# Patient Record
Sex: FEMALE | Race: WHITE | NOT HISPANIC OR LATINO | URBAN - METROPOLITAN AREA
[De-identification: names, ages, dates, MRNs, and addresses within clinical notes are randomized per-mention and may not be internally consistent; named-entity substitution may affect disease eponyms.]

---

## 2017-10-20 ENCOUNTER — APPOINTMENT (RX ONLY)
Dept: URBAN - METROPOLITAN AREA CLINIC 20 | Facility: CLINIC | Age: 67
Setting detail: DERMATOLOGY
End: 2017-10-20

## 2017-10-20 DIAGNOSIS — L57.8 OTHER SKIN CHANGES DUE TO CHRONIC EXPOSURE TO NONIONIZING RADIATION: ICD-10-CM

## 2017-10-20 DIAGNOSIS — L82.1 OTHER SEBORRHEIC KERATOSIS: ICD-10-CM

## 2017-10-20 PROBLEM — I10 ESSENTIAL (PRIMARY) HYPERTENSION: Status: ACTIVE | Noted: 2017-10-20

## 2017-10-20 PROCEDURE — ? ADDITIONAL NOTES

## 2017-10-20 PROCEDURE — ? COUNSELING

## 2017-10-20 PROCEDURE — 17110 DESTRUCTION B9 LES UP TO 14: CPT

## 2017-10-20 PROCEDURE — 99213 OFFICE O/P EST LOW 20 MIN: CPT | Mod: 25

## 2017-10-20 PROCEDURE — ? SUNSCREEN RECOMMENDATIONS

## 2017-10-20 PROCEDURE — ? LIQUID NITROGEN

## 2017-10-20 ASSESSMENT — LOCATION DETAILED DESCRIPTION DERM
LOCATION DETAILED: RIGHT SUPERIOR LATERAL MALAR CHEEK
LOCATION DETAILED: LEFT INFERIOR MEDIAL FOREHEAD
LOCATION DETAILED: LEFT POSTERIOR EAR
LOCATION DETAILED: RIGHT RADIAL DORSAL HAND
LOCATION DETAILED: RIGHT LATERAL ZYGOMA
LOCATION DETAILED: LEFT INFERIOR ANTERIOR NECK
LOCATION DETAILED: RIGHT SUPERIOR NASAL CHEEK
LOCATION DETAILED: LEFT ULNAR DORSAL HAND
LOCATION DETAILED: LEFT SUPERIOR HELIX

## 2017-10-20 ASSESSMENT — LOCATION ZONE DERM
LOCATION ZONE: NECK
LOCATION ZONE: EAR
LOCATION ZONE: HAND
LOCATION ZONE: FACE

## 2017-10-20 ASSESSMENT — LOCATION SIMPLE DESCRIPTION DERM
LOCATION SIMPLE: RIGHT CHEEK
LOCATION SIMPLE: RIGHT HAND
LOCATION SIMPLE: LEFT FOREHEAD
LOCATION SIMPLE: LEFT ANTERIOR NECK
LOCATION SIMPLE: RIGHT ZYGOMA
LOCATION SIMPLE: LEFT HAND
LOCATION SIMPLE: LEFT EAR

## 2017-10-20 NOTE — PROCEDURE: COUNSELING
Patient Specific Counseling (Will Not Stick From Patient To Patient): Cosmetic fee of $80.00 quoted for LN2 today. Pt opts to proceed with LN2 as it has worked well in the past cosmetically.
Detail Level: Detailed
Detail Level: Zone

## 2017-10-20 NOTE — PROCEDURE: LIQUID NITROGEN
Include Z78.9 (Other Specified Conditions Influencing Health Status) As An Associated Diagnosis?: No
Medical Necessity Information: It is in your best interest to select a reason for this procedure from the list below. All of these items fulfill various CMS LCD requirements except the new and changing color options.
Medical Necessity Clause: This procedure was medically necessary because the lesions that were treated were:
Consent: The patient's consent was obtained including but not limited to risks of crusting, scabbing, blistering, scarring, darker or lighter pigmentary change, recurrence, incomplete removal and infection.
Detail Level: Detailed
Post-Care Instructions: I reviewed with the patient in detail post-care instructions. Patient is to wear sunprotection, and avoid picking at any of the treated lesions. Pt may apply Vaseline to crusted or scabbing areas.

## 2017-10-20 NOTE — PROCEDURE: ADDITIONAL NOTES
Additional Notes: Advised FBE in the next few months. Pt will schedule as she does not have time today for FBE.

## 2018-01-19 ENCOUNTER — APPOINTMENT (RX ONLY)
Dept: URBAN - METROPOLITAN AREA CLINIC 20 | Facility: CLINIC | Age: 68
Setting detail: DERMATOLOGY
End: 2018-01-19

## 2018-01-19 DIAGNOSIS — D18.0 HEMANGIOMA: ICD-10-CM

## 2018-01-19 DIAGNOSIS — L82.1 OTHER SEBORRHEIC KERATOSIS: ICD-10-CM

## 2018-01-19 DIAGNOSIS — D22 MELANOCYTIC NEVI: ICD-10-CM

## 2018-01-19 DIAGNOSIS — Z71.89 OTHER SPECIFIED COUNSELING: ICD-10-CM

## 2018-01-19 DIAGNOSIS — L82.0 INFLAMED SEBORRHEIC KERATOSIS: ICD-10-CM

## 2018-01-19 DIAGNOSIS — L81.4 OTHER MELANIN HYPERPIGMENTATION: ICD-10-CM

## 2018-01-19 PROBLEM — D22.5 MELANOCYTIC NEVI OF TRUNK: Status: ACTIVE | Noted: 2018-01-19

## 2018-01-19 PROBLEM — D18.01 HEMANGIOMA OF SKIN AND SUBCUTANEOUS TISSUE: Status: ACTIVE | Noted: 2018-01-19

## 2018-01-19 PROBLEM — D22.12 MELANOCYTIC NEVI OF LEFT EYELID, INCLUDING CANTHUS: Status: ACTIVE | Noted: 2018-01-19

## 2018-01-19 PROBLEM — D22.62 MELANOCYTIC NEVI OF LEFT UPPER LIMB, INCLUDING SHOULDER: Status: ACTIVE | Noted: 2018-01-19

## 2018-01-19 PROBLEM — D22.61 MELANOCYTIC NEVI OF RIGHT UPPER LIMB, INCLUDING SHOULDER: Status: ACTIVE | Noted: 2018-01-19

## 2018-01-19 PROCEDURE — 17110 DESTRUCTION B9 LES UP TO 14: CPT

## 2018-01-19 PROCEDURE — 99214 OFFICE O/P EST MOD 30 MIN: CPT | Mod: 25

## 2018-01-19 PROCEDURE — ? OBSERVATION AND MEASURE

## 2018-01-19 PROCEDURE — ? SUNSCREEN RECOMMENDATIONS

## 2018-01-19 PROCEDURE — ? LIQUID NITROGEN

## 2018-01-19 PROCEDURE — ? COUNSELING

## 2018-01-19 ASSESSMENT — LOCATION ZONE DERM
LOCATION ZONE: EAR
LOCATION ZONE: EYELID
LOCATION ZONE: TRUNK
LOCATION ZONE: ARM
LOCATION ZONE: FACE
LOCATION ZONE: HAND
LOCATION ZONE: EYELID

## 2018-01-19 ASSESSMENT — LOCATION DETAILED DESCRIPTION DERM
LOCATION DETAILED: LEFT RADIAL DORSAL HAND
LOCATION DETAILED: RIGHT VENTRAL DISTAL FOREARM
LOCATION DETAILED: RIGHT RADIAL DORSAL HAND
LOCATION DETAILED: SUPERIOR THORACIC SPINE
LOCATION DETAILED: RIGHT INFERIOR MEDIAL FOREHEAD
LOCATION DETAILED: LEFT PROXIMAL DORSAL FOREARM
LOCATION DETAILED: RIGHT SUPERIOR MEDIAL UPPER BACK
LOCATION DETAILED: RIGHT MEDIAL INFERIOR EYELID
LOCATION DETAILED: LEFT POSTERIOR EAR
LOCATION DETAILED: RIGHT SUPERIOR MEDIAL MALAR CHEEK
LOCATION DETAILED: RIGHT PROXIMAL DORSAL FOREARM
LOCATION DETAILED: INFERIOR THORACIC SPINE
LOCATION DETAILED: LEFT LATERAL INFERIOR EYELID
LOCATION DETAILED: RIGHT MEDIAL UPPER BACK
LOCATION DETAILED: RIGHT INFERIOR UPPER BACK
LOCATION DETAILED: LEFT VENTRAL PROXIMAL FOREARM
LOCATION DETAILED: RIGHT POSTERIOR SHOULDER

## 2018-01-19 ASSESSMENT — LOCATION SIMPLE DESCRIPTION DERM
LOCATION SIMPLE: LEFT FOREARM
LOCATION SIMPLE: RIGHT CHEEK
LOCATION SIMPLE: RIGHT FOREARM
LOCATION SIMPLE: RIGHT UPPER BACK
LOCATION SIMPLE: UPPER BACK
LOCATION SIMPLE: RIGHT HAND
LOCATION SIMPLE: RIGHT SHOULDER
LOCATION SIMPLE: LEFT HAND
LOCATION SIMPLE: RIGHT INFERIOR EYELID
LOCATION SIMPLE: LEFT INFERIOR EYELID
LOCATION SIMPLE: LEFT EAR
LOCATION SIMPLE: RIGHT FOREHEAD

## 2018-01-19 NOTE — PROCEDURE: LIQUID NITROGEN
Add 52 Modifier (Optional): no
Medical Necessity Information: It is in your best interest to select a reason for this procedure from the list below. All of these items fulfill various CMS LCD requirements except the new and changing color options.
Post-Care Instructions: I reviewed with the patient in detail post-care instructions. Patient is to wear sunprotection, and avoid picking at any of the treated lesions. Pt may apply Vaseline to crusted or scabbing areas.
Detail Level: Detailed
Consent: The patient's consent was obtained including but not limited to risks of crusting, scabbing, blistering, scarring, darker or lighter pigmentary change, recurrence, incomplete removal and infection.
Medical Necessity Clause: This procedure was medically necessary because the lesions that were treated were:

## 2018-07-20 ENCOUNTER — APPOINTMENT (RX ONLY)
Dept: URBAN - METROPOLITAN AREA CLINIC 20 | Facility: CLINIC | Age: 68
Setting detail: DERMATOLOGY
End: 2018-07-20

## 2018-07-20 DIAGNOSIS — L81.4 OTHER MELANIN HYPERPIGMENTATION: ICD-10-CM

## 2018-07-20 DIAGNOSIS — D18.0 HEMANGIOMA: ICD-10-CM

## 2018-07-20 DIAGNOSIS — D22 MELANOCYTIC NEVI: ICD-10-CM

## 2018-07-20 DIAGNOSIS — Z71.89 OTHER SPECIFIED COUNSELING: ICD-10-CM

## 2018-07-20 DIAGNOSIS — L82.1 OTHER SEBORRHEIC KERATOSIS: ICD-10-CM

## 2018-07-20 PROBLEM — D22.61 MELANOCYTIC NEVI OF RIGHT UPPER LIMB, INCLUDING SHOULDER: Status: ACTIVE | Noted: 2018-07-20

## 2018-07-20 PROBLEM — D18.01 HEMANGIOMA OF SKIN AND SUBCUTANEOUS TISSUE: Status: ACTIVE | Noted: 2018-07-20

## 2018-07-20 PROBLEM — D22.72 MELANOCYTIC NEVI OF LEFT LOWER LIMB, INCLUDING HIP: Status: ACTIVE | Noted: 2018-07-20

## 2018-07-20 PROBLEM — D22.5 MELANOCYTIC NEVI OF TRUNK: Status: ACTIVE | Noted: 2018-07-20

## 2018-07-20 PROBLEM — D22.71 MELANOCYTIC NEVI OF RIGHT LOWER LIMB, INCLUDING HIP: Status: ACTIVE | Noted: 2018-07-20

## 2018-07-20 PROBLEM — D22.39 MELANOCYTIC NEVI OF OTHER PARTS OF FACE: Status: ACTIVE | Noted: 2018-07-20

## 2018-07-20 PROBLEM — D22.62 MELANOCYTIC NEVI OF LEFT UPPER LIMB, INCLUDING SHOULDER: Status: ACTIVE | Noted: 2018-07-20

## 2018-07-20 PROCEDURE — ? OBSERVATION AND MEASURE

## 2018-07-20 PROCEDURE — ? COUNSELING

## 2018-07-20 PROCEDURE — 99214 OFFICE O/P EST MOD 30 MIN: CPT

## 2018-07-20 PROCEDURE — ? SUNSCREEN RECOMMENDATIONS

## 2018-07-20 ASSESSMENT — LOCATION ZONE DERM
LOCATION ZONE: LEG
LOCATION ZONE: FACE
LOCATION ZONE: ARM
LOCATION ZONE: FINGER
LOCATION ZONE: TRUNK

## 2018-07-20 ASSESSMENT — LOCATION DETAILED DESCRIPTION DERM
LOCATION DETAILED: RIGHT SUPERIOR UPPER BACK
LOCATION DETAILED: RIGHT INFERIOR CENTRAL MALAR CHEEK
LOCATION DETAILED: LEFT LATERAL PROXIMAL PRETIBIAL REGION
LOCATION DETAILED: RIGHT INFERIOR FOREHEAD
LOCATION DETAILED: INFERIOR THORACIC SPINE
LOCATION DETAILED: RIGHT DISTAL POSTERIOR UPPER ARM
LOCATION DETAILED: RIGHT VENTRAL PROXIMAL FOREARM
LOCATION DETAILED: RIGHT PROXIMAL DORSAL MIDDLE FINGER
LOCATION DETAILED: LEFT DISTAL POSTERIOR THIGH
LOCATION DETAILED: RIGHT PROXIMAL PRETIBIAL REGION
LOCATION DETAILED: RIGHT INFERIOR MEDIAL UPPER BACK
LOCATION DETAILED: LEFT VENTRAL PROXIMAL FOREARM
LOCATION DETAILED: RIGHT INFERIOR MEDIAL MIDBACK
LOCATION DETAILED: LEFT PROXIMAL POSTERIOR UPPER ARM
LOCATION DETAILED: RIGHT DISTAL POSTERIOR THIGH

## 2018-07-20 ASSESSMENT — LOCATION SIMPLE DESCRIPTION DERM
LOCATION SIMPLE: LEFT PRETIBIAL REGION
LOCATION SIMPLE: RIGHT UPPER BACK
LOCATION SIMPLE: RIGHT MIDDLE FINGER
LOCATION SIMPLE: RIGHT POSTERIOR THIGH
LOCATION SIMPLE: RIGHT PRETIBIAL REGION
LOCATION SIMPLE: LEFT FOREARM
LOCATION SIMPLE: LEFT POSTERIOR UPPER ARM
LOCATION SIMPLE: RIGHT CHEEK
LOCATION SIMPLE: RIGHT POSTERIOR UPPER ARM
LOCATION SIMPLE: LEFT POSTERIOR THIGH
LOCATION SIMPLE: UPPER BACK
LOCATION SIMPLE: RIGHT LOWER BACK
LOCATION SIMPLE: RIGHT FOREHEAD
LOCATION SIMPLE: RIGHT FOREARM

## 2019-01-06 NOTE — PROCEDURE: SUNSCREEN RECOMMENDATIONS
Detail Level: Zone
General Sunscreen Counseling: Reviewed sun protection including SPF 30 or higher, reapplication every 2 hours while in direct sunlight, sunglasses, wide brim hats, UPF clothing.
85

## 2019-01-26 ENCOUNTER — APPOINTMENT (OUTPATIENT)
Dept: RADIOLOGY | Facility: MEDICAL CENTER | Age: 69
End: 2019-01-26
Attending: EMERGENCY MEDICINE
Payer: MEDICARE

## 2019-01-26 ENCOUNTER — HOSPITAL ENCOUNTER (EMERGENCY)
Facility: MEDICAL CENTER | Age: 69
End: 2019-01-26
Attending: EMERGENCY MEDICINE
Payer: MEDICARE

## 2019-01-26 VITALS
DIASTOLIC BLOOD PRESSURE: 88 MMHG | TEMPERATURE: 97.2 F | BODY MASS INDEX: 30.15 KG/M2 | HEIGHT: 64 IN | SYSTOLIC BLOOD PRESSURE: 156 MMHG | HEART RATE: 67 BPM | RESPIRATION RATE: 14 BRPM | WEIGHT: 176.59 LBS | OXYGEN SATURATION: 97 %

## 2019-01-26 DIAGNOSIS — R10.9 RIGHT FLANK PAIN: ICD-10-CM

## 2019-01-26 LAB
ALBUMIN SERPL BCP-MCNC: 3.9 G/DL (ref 3.2–4.9)
ALBUMIN/GLOB SERPL: 1.3 G/DL
ALP SERPL-CCNC: 83 U/L (ref 30–99)
ALT SERPL-CCNC: 23 U/L (ref 2–50)
ANION GAP SERPL CALC-SCNC: 8 MMOL/L (ref 0–11.9)
APPEARANCE UR: CLEAR
AST SERPL-CCNC: 19 U/L (ref 12–45)
BACTERIA #/AREA URNS HPF: NEGATIVE /HPF
BASOPHILS # BLD AUTO: 1.2 % (ref 0–1.8)
BASOPHILS # BLD: 0.09 K/UL (ref 0–0.12)
BILIRUB SERPL-MCNC: 0.4 MG/DL (ref 0.1–1.5)
BILIRUB UR QL STRIP.AUTO: NEGATIVE
BUN SERPL-MCNC: 18 MG/DL (ref 8–22)
CALCIUM SERPL-MCNC: 8.9 MG/DL (ref 8.5–10.5)
CHLORIDE SERPL-SCNC: 105 MMOL/L (ref 96–112)
CO2 SERPL-SCNC: 25 MMOL/L (ref 20–33)
COLOR UR: YELLOW
CREAT SERPL-MCNC: 0.86 MG/DL (ref 0.5–1.4)
EOSINOPHIL # BLD AUTO: 0.23 K/UL (ref 0–0.51)
EOSINOPHIL NFR BLD: 3 % (ref 0–6.9)
EPI CELLS #/AREA URNS HPF: NORMAL /HPF
ERYTHROCYTE [DISTWIDTH] IN BLOOD BY AUTOMATED COUNT: 44.9 FL (ref 35.9–50)
GLOBULIN SER CALC-MCNC: 3 G/DL (ref 1.9–3.5)
GLUCOSE SERPL-MCNC: 102 MG/DL (ref 65–99)
GLUCOSE UR STRIP.AUTO-MCNC: NEGATIVE MG/DL
HCT VFR BLD AUTO: 47 % (ref 37–47)
HGB BLD-MCNC: 15.6 G/DL (ref 12–16)
HYALINE CASTS #/AREA URNS LPF: NORMAL /LPF
IMM GRANULOCYTES # BLD AUTO: 0.06 K/UL (ref 0–0.11)
IMM GRANULOCYTES NFR BLD AUTO: 0.8 % (ref 0–0.9)
KETONES UR STRIP.AUTO-MCNC: NEGATIVE MG/DL
LEUKOCYTE ESTERASE UR QL STRIP.AUTO: ABNORMAL
LIPASE SERPL-CCNC: 18 U/L (ref 11–82)
LYMPHOCYTES # BLD AUTO: 3.57 K/UL (ref 1–4.8)
LYMPHOCYTES NFR BLD: 47.3 % (ref 22–41)
MCH RBC QN AUTO: 29.7 PG (ref 27–33)
MCHC RBC AUTO-ENTMCNC: 33.2 G/DL (ref 33.6–35)
MCV RBC AUTO: 89.4 FL (ref 81.4–97.8)
MICRO URNS: ABNORMAL
MONOCYTES # BLD AUTO: 0.39 K/UL (ref 0–0.85)
MONOCYTES NFR BLD AUTO: 5.2 % (ref 0–13.4)
NEUTROPHILS # BLD AUTO: 3.21 K/UL (ref 2–7.15)
NEUTROPHILS NFR BLD: 42.5 % (ref 44–72)
NITRITE UR QL STRIP.AUTO: NEGATIVE
NRBC # BLD AUTO: 0 K/UL
NRBC BLD-RTO: 0 /100 WBC
PH UR STRIP.AUTO: 6 [PH]
PLATELET # BLD AUTO: 261 K/UL (ref 164–446)
PMV BLD AUTO: 9.7 FL (ref 9–12.9)
POTASSIUM SERPL-SCNC: 4.1 MMOL/L (ref 3.6–5.5)
PROT SERPL-MCNC: 6.9 G/DL (ref 6–8.2)
PROT UR QL STRIP: NEGATIVE MG/DL
RBC # BLD AUTO: 5.26 M/UL (ref 4.2–5.4)
RBC # URNS HPF: NORMAL /HPF
RBC UR QL AUTO: NEGATIVE
SODIUM SERPL-SCNC: 138 MMOL/L (ref 135–145)
SP GR UR STRIP.AUTO: 1
UROBILINOGEN UR STRIP.AUTO-MCNC: 0.2 MG/DL
WBC # BLD AUTO: 7.6 K/UL (ref 4.8–10.8)
WBC #/AREA URNS HPF: NORMAL /HPF

## 2019-01-26 PROCEDURE — 85025 COMPLETE CBC W/AUTO DIFF WBC: CPT

## 2019-01-26 PROCEDURE — 74176 CT ABD & PELVIS W/O CONTRAST: CPT

## 2019-01-26 PROCEDURE — 99284 EMERGENCY DEPT VISIT MOD MDM: CPT

## 2019-01-26 PROCEDURE — 80053 COMPREHEN METABOLIC PANEL: CPT

## 2019-01-26 PROCEDURE — 83690 ASSAY OF LIPASE: CPT

## 2019-01-26 PROCEDURE — 36415 COLL VENOUS BLD VENIPUNCTURE: CPT

## 2019-01-26 PROCEDURE — 303977 HCHG I & D

## 2019-01-26 PROCEDURE — 81001 URINALYSIS AUTO W/SCOPE: CPT

## 2019-01-26 RX ORDER — LOSARTAN POTASSIUM 50 MG/1
50 TABLET ORAL DAILY
COMMUNITY

## 2019-01-26 RX ORDER — DOXYCYCLINE HYCLATE 100 MG
100 TABLET ORAL 2 TIMES DAILY
COMMUNITY

## 2019-01-26 RX ORDER — LEVOTHYROXINE SODIUM 88 UG/1
88 TABLET ORAL
COMMUNITY

## 2019-01-26 ASSESSMENT — PAIN DESCRIPTION - DESCRIPTORS: DESCRIPTORS: NAGGING

## 2019-01-27 NOTE — ED PROVIDER NOTES
ED Provider Note    Scribed for Ronnell Gaming M.D. by Jaylan Kaiser. 1/26/2019,  8:52 PM.    Means of Arrival: Walk In  History obtained from: Patient  History limited by: None    CHIEF COMPLAINT  Chief Complaint   Patient presents with   • Flank Pain       HPI  Lyubov Bejarano is a 68 y.o. female who presents to the Emergency Department for evaluation of intermittent episodes of right sided flank pain onset 4 hours ago. The patient reports that she has had 3 episodes of the pain, noting that each one comes on gradually and lasts about 15-20 minutes. She rates the pain as a 5/10 in severity and confirms that it is non-radiating. Per patient, she has previously had similar flank pain which she notes feels identical to her current symptoms. She states that she is able to alleviate her pain when she drinks significant amounts of water. She denies experiencing fever, abdominal pain, dysuria, and previous kidney stones.     REVIEW OF SYSTEMS    CONSTITUTIONAL:  No fever.  GASTROINTESTINAL:  No abdominal pain.  GENITOURINARY: No dysuria. Right sided flank pain.     See HPI for further details.   All other systems reviewed and are negative.     PAST MEDICAL HISTORY  Past Medical History:   Diagnosis Date   • High cholesterol    • Hypertension    • Hypothyroid        FAMILY HISTORY  History reviewed. No pertinent family history.    SOCIAL HISTORY   reports that she has never smoked. She has never used smokeless tobacco. She reports that she does not drink alcohol or use drugs.    SURGICAL HISTORY  No previous kidney surgeries.     CURRENT MEDICATIONS  Home Medications     Reviewed by Amber Chapman R.N. (Registered Nurse) on 01/26/19 at 2025  Med List Status: Complete   Medication Last Dose Status   doxycycline (VIBRAMYCIN) 100 MG Tab 1/26/2019 Active   levothyroxine (SYNTHROID) 88 MCG Tab 1/25/2019 Active   losartan (COZAAR) 50 MG Tab 1/25/2019 Active                ALLERGIES  No known allergies.     PHYSICAL  "EXAM  VITAL SIGNS: BP (!) 176/90   Pulse 98   Temp 36.7 °C (98 °F) (Temporal)   Resp 16   Ht 1.626 m (5' 4\")   Wt 80.1 kg (176 lb 9.4 oz)   SpO2 97%   BMI 30.31 kg/m²      Gen: Alert, no acute distress  HEENT: ATNC  Neck: trachea midline  Resp: no respiratory distress. Clear to auscultation bilaterally.   CV: No JVD  Abd: non-distended. Soft. Nontender.   Back: No CVA tenderness.   Ext: No deformities  Psych: normal mood  Neuro: speech fluent     DIAGNOSTIC STUDIES / PROCEDURES     LABS  Results for orders placed or performed during the hospital encounter of 01/26/19   URINALYSIS CULTURE, IF INDICATED   Result Value Ref Range    Color Yellow     Character Clear     Specific Gravity 1.003 <1.035    Ph 6.0 5.0 - 8.0    Glucose Negative Negative mg/dL    Ketones Negative Negative mg/dL    Protein Negative Negative mg/dL    Bilirubin Negative Negative    Urobilinogen, Urine 0.2 Negative    Nitrite Negative Negative    Leukocyte Esterase Small (A) Negative    Occult Blood Negative Negative    Micro Urine Req Microscopic    CBC WITH DIFFERENTIAL   Result Value Ref Range    WBC 7.6 4.8 - 10.8 K/uL    RBC 5.26 4.20 - 5.40 M/uL    Hemoglobin 15.6 12.0 - 16.0 g/dL    Hematocrit 47.0 37.0 - 47.0 %    MCV 89.4 81.4 - 97.8 fL    MCH 29.7 27.0 - 33.0 pg    MCHC 33.2 (L) 33.6 - 35.0 g/dL    RDW 44.9 35.9 - 50.0 fL    Platelet Count 261 164 - 446 K/uL    MPV 9.7 9.0 - 12.9 fL    Neutrophils-Polys 42.50 (L) 44.00 - 72.00 %    Lymphocytes 47.30 (H) 22.00 - 41.00 %    Monocytes 5.20 0.00 - 13.40 %    Eosinophils 3.00 0.00 - 6.90 %    Basophils 1.20 0.00 - 1.80 %    Immature Granulocytes 0.80 0.00 - 0.90 %    Nucleated RBC 0.00 /100 WBC    Neutrophils (Absolute) 3.21 2.00 - 7.15 K/uL    Lymphs (Absolute) 3.57 1.00 - 4.80 K/uL    Monos (Absolute) 0.39 0.00 - 0.85 K/uL    Eos (Absolute) 0.23 0.00 - 0.51 K/uL    Baso (Absolute) 0.09 0.00 - 0.12 K/uL    Immature Granulocytes (abs) 0.06 0.00 - 0.11 K/uL    NRBC (Absolute) 0.00 K/uL "   COMP METABOLIC PANEL   Result Value Ref Range    Sodium 138 135 - 145 mmol/L    Potassium 4.1 3.6 - 5.5 mmol/L    Chloride 105 96 - 112 mmol/L    Co2 25 20 - 33 mmol/L    Anion Gap 8.0 0.0 - 11.9    Glucose 102 (H) 65 - 99 mg/dL    Bun 18 8 - 22 mg/dL    Creatinine 0.86 0.50 - 1.40 mg/dL    Calcium 8.9 8.5 - 10.5 mg/dL    AST(SGOT) 19 12 - 45 U/L    ALT(SGPT) 23 2 - 50 U/L    Alkaline Phosphatase 83 30 - 99 U/L    Total Bilirubin 0.4 0.1 - 1.5 mg/dL    Albumin 3.9 3.2 - 4.9 g/dL    Total Protein 6.9 6.0 - 8.2 g/dL    Globulin 3.0 1.9 - 3.5 g/dL    A-G Ratio 1.3 g/dL   LIPASE   Result Value Ref Range    Lipase 18 11 - 82 U/L   URINE MICROSCOPIC (W/UA)   Result Value Ref Range    WBC 0-2 /hpf    RBC 0-2 /hpf    Bacteria Negative None /hpf    Epithelial Cells Few /hpf    Hyaline Cast 0-2 /lpf   ESTIMATED GFR   Result Value Ref Range    GFR If African American >60 >60 mL/min/1.73 m 2    GFR If Non African American >60 >60 mL/min/1.73 m 2       All labs reviewed by me.    RADIOLOGY  CT-RENAL COLIC EVALUATION(A/P W/O)   Final Result      1.  No evidence of nephroureterolithiasis or obstructive uropathy.   2.  Right hepatic dome hypodensity likely representing cyst or hemangioma.   3.  Rim calcified splenic cyst likely on the basis of prior infection or trauma.   4.  Atherosclerosis.        The radiologist’s interpretation of all radiology studies have been reviewed by me.    COURSE & MEDICAL DECISION MAKING  Pertinent Labs & Imaging studies reviewed. (See chart for details)    MDM:  Patient presents with right flank pain.  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  Will obtain urinalysis.  Also possible kidney stone.  Given the patient's age, AAA is also a possibility.  Will obtain CAT scan of the abdomen and pelvis to evaluate for intra-abdominal infection, AAA, renal stone.  Will obtain urinalysis and basic labs to evaluate for signs of  infection.    8:52 PM Patient seen and examined at bedside. I informed the patient that we will get blood work and do a CT of her Kidney for further evaluation to rule out anything dangerous. She understood and agreed to the plan of care.     10:09 PM  Patient has negative workup.  She was notified of her incidental findings on the CT scan.  Patient reports that her pain has returned in the right flank.  On repeat examination, no CVA tenderness, no muscle tenderness, normal strength.  Unclear etiology for her symptoms but at this time does not appear to be dangerous.  The patient was advised to follow-up with her regular doctor.  She declined medications for pain at this time    The patient will return for new or worsening symptoms and is stable at the time of discharge.    The patient is referred to a primary physician for blood pressure management, diabetic screening, and for all other preventative health concerns.    DISPOSITION:  Patient will be discharged home in stable condition.    FOLLOW UP:  Miquel Gramajo M.D.  [address]  [address]  [place] [address]  [phone]    In 2 days      FINAL IMPRESSION  1. Right flank pain            Jaylan CORDON (Georges), am scribing for, and in the presence of, Ronnell Gaming M.D..    Electronically signed by: Jaylan Kaiser (Georges), 1/26/2019    Ronnell CORDON M.D. personally performed the services described in this documentation, as scribed by Jaylan Kaiser in my presence, and it is both accurate and complete. C.     The note accurately reflects work and decisions made by me.  Ronnell Gaming  1/26/2019  10:10 PM

## 2019-01-27 NOTE — ED NOTES
Patient walked with steady gate at this time to er [facility] area room 77, ready to be seen. Placed her into gown, gave warm blanket, and call light  rn at bedside

## 2019-01-27 NOTE — DISCHARGE INSTRUCTIONS
You were seen in the emergency department for flank pain.  Your workup, including labs, urinalysis, and CAT scan did not demonstrate any dangerous findings.  There is no evidence of kidney stone, urinary tract infection, or other obvious cause for your pain.  The cause of your pain is not clear, however does not appear to be dangerous at this time.    Your CAT scan demonstrated a likely cyst in your liver.  This should be discussed with your regular doctor.    Please return to the emergency department seek medical attention if you develop:  Burning with urine, severe persistent pain, abdominal pain, vomiting, fevers, other concerning findings.

## 2019-01-27 NOTE — ED TRIAGE NOTES
"Chief Complaint   Patient presents with   • Flank Pain       Ambulatory to triage with steady gait. Patient states that she has been having left sided flank pain on/off today. Patient states she is being treated for bronchitis and taking antibiotics. She states that she has had the flu previously to having bronchitis.  Patient states when she drinks water her pain goes away. A+Ox4. VSS. Denies pain with urination or N/v/d    Patient states that she is allergic to an antibiotic that starts with a \"B\". Patient could not recall the name.        "

## 2021-01-15 DIAGNOSIS — Z23 NEED FOR VACCINATION: ICD-10-CM

## 2023-02-24 ENCOUNTER — APPOINTMENT (RX ONLY)
Dept: URBAN - METROPOLITAN AREA CLINIC 20 | Facility: CLINIC | Age: 73
Setting detail: DERMATOLOGY
End: 2023-02-24

## 2023-02-24 DIAGNOSIS — L82.0 INFLAMED SEBORRHEIC KERATOSIS: ICD-10-CM

## 2023-02-24 DIAGNOSIS — D22 MELANOCYTIC NEVI: ICD-10-CM | Status: STABLE

## 2023-02-24 DIAGNOSIS — L82.1 OTHER SEBORRHEIC KERATOSIS: ICD-10-CM

## 2023-02-24 DIAGNOSIS — L57.0 ACTINIC KERATOSIS: ICD-10-CM

## 2023-02-24 DIAGNOSIS — Z71.89 OTHER SPECIFIED COUNSELING: ICD-10-CM

## 2023-02-24 DIAGNOSIS — D18.0 HEMANGIOMA: ICD-10-CM

## 2023-02-24 DIAGNOSIS — L81.4 OTHER MELANIN HYPERPIGMENTATION: ICD-10-CM | Status: STABLE

## 2023-02-24 PROBLEM — D22.61 MELANOCYTIC NEVI OF RIGHT UPPER LIMB, INCLUDING SHOULDER: Status: ACTIVE | Noted: 2023-02-24

## 2023-02-24 PROBLEM — D22.71 MELANOCYTIC NEVI OF RIGHT LOWER LIMB, INCLUDING HIP: Status: ACTIVE | Noted: 2023-02-24

## 2023-02-24 PROBLEM — D22.62 MELANOCYTIC NEVI OF LEFT UPPER LIMB, INCLUDING SHOULDER: Status: ACTIVE | Noted: 2023-02-24

## 2023-02-24 PROBLEM — D22.72 MELANOCYTIC NEVI OF LEFT LOWER LIMB, INCLUDING HIP: Status: ACTIVE | Noted: 2023-02-24

## 2023-02-24 PROBLEM — D18.01 HEMANGIOMA OF SKIN AND SUBCUTANEOUS TISSUE: Status: ACTIVE | Noted: 2023-02-24

## 2023-02-24 PROBLEM — D22.5 MELANOCYTIC NEVI OF TRUNK: Status: ACTIVE | Noted: 2023-02-24

## 2023-02-24 PROBLEM — D22.39 MELANOCYTIC NEVI OF OTHER PARTS OF FACE: Status: ACTIVE | Noted: 2023-02-24

## 2023-02-24 PROCEDURE — ? SUNSCREEN RECOMMENDATIONS

## 2023-02-24 PROCEDURE — ? COUNSELING

## 2023-02-24 PROCEDURE — ? OBSERVATION AND MEASURE

## 2023-02-24 PROCEDURE — ? LIQUID NITROGEN

## 2023-02-24 PROCEDURE — 99203 OFFICE O/P NEW LOW 30 MIN: CPT | Mod: 25

## 2023-02-24 PROCEDURE — 17000 DESTRUCT PREMALG LESION: CPT

## 2023-02-24 PROCEDURE — 17003 DESTRUCT PREMALG LES 2-14: CPT

## 2023-02-24 ASSESSMENT — LOCATION DETAILED DESCRIPTION DERM
LOCATION DETAILED: LEFT MID-UPPER BACK
LOCATION DETAILED: RIGHT VENTRAL PROXIMAL FOREARM
LOCATION DETAILED: LEFT LATERAL MALAR CHEEK
LOCATION DETAILED: RIGHT INFERIOR MEDIAL UPPER BACK
LOCATION DETAILED: NASAL DORSUM
LOCATION DETAILED: LEFT DISTAL POSTERIOR THIGH
LOCATION DETAILED: RIGHT DISTAL POSTERIOR THIGH
LOCATION DETAILED: LEFT CENTRAL ZYGOMA
LOCATION DETAILED: RIGHT SUPERIOR UPPER BACK
LOCATION DETAILED: RIGHT PROXIMAL PRETIBIAL REGION
LOCATION DETAILED: RIGHT DISTAL POSTERIOR UPPER ARM
LOCATION DETAILED: RIGHT PROXIMAL DORSAL MIDDLE FINGER
LOCATION DETAILED: RIGHT PROXIMAL DORSAL FOREARM
LOCATION DETAILED: RIGHT INFERIOR FOREHEAD
LOCATION DETAILED: LEFT SUPERIOR UPPER BACK
LOCATION DETAILED: INFERIOR THORACIC SPINE
LOCATION DETAILED: LEFT SUPERIOR CENTRAL MALAR CHEEK
LOCATION DETAILED: LEFT VENTRAL PROXIMAL FOREARM
LOCATION DETAILED: RIGHT MID-UPPER BACK
LOCATION DETAILED: RIGHT MEDIAL UPPER BACK
LOCATION DETAILED: LEFT SUPERIOR HELIX
LOCATION DETAILED: LEFT LATERAL ZYGOMA
LOCATION DETAILED: RIGHT INFERIOR MEDIAL MIDBACK
LOCATION DETAILED: RIGHT INFERIOR CENTRAL MALAR CHEEK
LOCATION DETAILED: LEFT LATERAL PROXIMAL PRETIBIAL REGION
LOCATION DETAILED: LEFT PROXIMAL POSTERIOR UPPER ARM

## 2023-02-24 ASSESSMENT — LOCATION SIMPLE DESCRIPTION DERM
LOCATION SIMPLE: RIGHT LOWER BACK
LOCATION SIMPLE: LEFT PRETIBIAL REGION
LOCATION SIMPLE: LEFT POSTERIOR UPPER ARM
LOCATION SIMPLE: RIGHT CHEEK
LOCATION SIMPLE: LEFT POSTERIOR THIGH
LOCATION SIMPLE: LEFT EAR
LOCATION SIMPLE: RIGHT POSTERIOR THIGH
LOCATION SIMPLE: RIGHT UPPER BACK
LOCATION SIMPLE: LEFT CHEEK
LOCATION SIMPLE: LEFT UPPER BACK
LOCATION SIMPLE: LEFT ZYGOMA
LOCATION SIMPLE: RIGHT PRETIBIAL REGION
LOCATION SIMPLE: LEFT FOREARM
LOCATION SIMPLE: NOSE
LOCATION SIMPLE: RIGHT POSTERIOR UPPER ARM
LOCATION SIMPLE: RIGHT FOREARM
LOCATION SIMPLE: UPPER BACK
LOCATION SIMPLE: RIGHT FOREHEAD
LOCATION SIMPLE: RIGHT MIDDLE FINGER

## 2023-02-24 ASSESSMENT — LOCATION ZONE DERM
LOCATION ZONE: FACE
LOCATION ZONE: ARM
LOCATION ZONE: FINGER
LOCATION ZONE: LEG
LOCATION ZONE: TRUNK
LOCATION ZONE: EAR
LOCATION ZONE: NOSE

## 2023-02-24 NOTE — PROCEDURE: LIQUID NITROGEN
Render Note In Bullet Format When Appropriate: No
Post-Care Instructions: I reviewed with the patient in detail post-care instructions. Patient is to wear sunprotection, and avoid picking at any of the treated lesions. Pt may apply Vaseline to crusted or scabbing areas.
Duration Of Freeze Thaw-Cycle (Seconds): 10
Show Aperture Variable?: Yes
Number Of Freeze-Thaw Cycles: 2 freeze-thaw cycles
Detail Level: Detailed
Consent: The patient's consent was obtained including but not limited to risks of crusting, scabbing, blistering, scarring, darker or lighter pigmentary change, recurrence, incomplete removal and infection. RTC in 2 months if lesion(s) persistent.

## 2024-05-21 ENCOUNTER — APPOINTMENT (RX ONLY)
Dept: URBAN - METROPOLITAN AREA CLINIC 20 | Facility: CLINIC | Age: 74
Setting detail: DERMATOLOGY
End: 2024-05-21

## 2024-05-21 DIAGNOSIS — L82.0 INFLAMED SEBORRHEIC KERATOSIS: ICD-10-CM

## 2024-05-21 DIAGNOSIS — D485 NEOPLASM OF UNCERTAIN BEHAVIOR OF SKIN: ICD-10-CM

## 2024-05-21 DIAGNOSIS — L81.4 OTHER MELANIN HYPERPIGMENTATION: ICD-10-CM | Status: STABLE

## 2024-05-21 DIAGNOSIS — L85.3 XEROSIS CUTIS: ICD-10-CM

## 2024-05-21 DIAGNOSIS — L72.8 OTHER FOLLICULAR CYSTS OF THE SKIN AND SUBCUTANEOUS TISSUE: ICD-10-CM

## 2024-05-21 PROBLEM — D48.5 NEOPLASM OF UNCERTAIN BEHAVIOR OF SKIN: Status: ACTIVE | Noted: 2024-05-21

## 2024-05-21 PROCEDURE — ? LIQUID NITROGEN

## 2024-05-21 PROCEDURE — 99213 OFFICE O/P EST LOW 20 MIN: CPT | Mod: 25

## 2024-05-21 PROCEDURE — 17110 DESTRUCTION B9 LES UP TO 14: CPT

## 2024-05-21 PROCEDURE — ? OBSERVATION AND MEASURE

## 2024-05-21 PROCEDURE — 11102 TANGNTL BX SKIN SINGLE LES: CPT | Mod: 59

## 2024-05-21 PROCEDURE — ? BIOPSY BY SHAVE METHOD

## 2024-05-21 PROCEDURE — ? COUNSELING

## 2024-05-21 ASSESSMENT — LOCATION ZONE DERM
LOCATION ZONE: FACE
LOCATION ZONE: FINGER
LOCATION ZONE: LEG
LOCATION ZONE: LIP
LOCATION ZONE: NECK

## 2024-05-21 ASSESSMENT — LOCATION DETAILED DESCRIPTION DERM
LOCATION DETAILED: LEFT CENTRAL MALAR CHEEK
LOCATION DETAILED: LEFT DISTAL PRETIBIAL REGION
LOCATION DETAILED: RIGHT INFERIOR CENTRAL MALAR CHEEK
LOCATION DETAILED: LEFT INFERIOR ANTERIOR NECK
LOCATION DETAILED: RIGHT INFERIOR LATERAL MALAR CHEEK
LOCATION DETAILED: LEFT INFERIOR CENTRAL MALAR CHEEK
LOCATION DETAILED: RIGHT SUPERIOR NASAL CHEEK
LOCATION DETAILED: RIGHT LATERAL ZYGOMA
LOCATION DETAILED: RIGHT PROXIMAL DORSAL MIDDLE FINGER
LOCATION DETAILED: RIGHT POSTERIOR ANKLE
LOCATION DETAILED: LEFT PROXIMAL PRETIBIAL REGION
LOCATION DETAILED: LEFT UPPER CUTANEOUS LIP
LOCATION DETAILED: LEFT CENTRAL ZYGOMA
LOCATION DETAILED: LEFT INFERIOR LATERAL BUCCAL CHEEK
LOCATION DETAILED: RIGHT PROXIMAL PRETIBIAL REGION

## 2024-05-21 ASSESSMENT — LOCATION SIMPLE DESCRIPTION DERM
LOCATION SIMPLE: RIGHT PRETIBIAL REGION
LOCATION SIMPLE: LEFT PRETIBIAL REGION
LOCATION SIMPLE: RIGHT ZYGOMA
LOCATION SIMPLE: LEFT LIP
LOCATION SIMPLE: RIGHT CHEEK
LOCATION SIMPLE: LEFT ANTERIOR NECK
LOCATION SIMPLE: LEFT CHEEK
LOCATION SIMPLE: LEFT ZYGOMA
LOCATION SIMPLE: RIGHT MIDDLE FINGER
LOCATION SIMPLE: RIGHT ANKLE

## 2024-05-21 NOTE — PROCEDURE: OBSERVATION
[FreeTextEntry3] : i was present with the PA during key portions of the history and exam. I agree with findings and have created the plan as outlined above.\par \par Appears to have single site [facility] consented today for be bio when blood work results will consent for LCHIV stratum VI no treatment observation only\par all questions answered
Detail Level: Detailed
Size Of Lesion: 1.5 cm
Size Of Lesion: .7cm

## 2024-05-21 NOTE — PROCEDURE: LIQUID NITROGEN
Number Of Freeze-Thaw Cycles: 2 freeze-thaw cycles
Spray Paint Technique: No
Medical Necessity Clause: This procedure was medically necessary because the lesions that were treated were:
Show Topical Anesthesia Variable?: Yes
Consent: The patient's consent was obtained including but not limited to risks of crusting, scabbing, blistering, scarring, darker or lighter pigmentary change, recurrence, incomplete removal and infection.
Detail Level: Detailed
Post-Care Instructions: I reviewed with the patient in detail post-care instructions. Patient is to wear sunprotection, and avoid picking at any of the treated lesions. Pt may apply Vaseline to crusted or scabbing areas.
Medical Necessity Information: It is in your best interest to select a reason for this procedure from the list below. All of these items fulfill various CMS LCD requirements except the new and changing color options.
Duration Of Freeze Thaw-Cycle (Seconds): 5-10
Spray Paint Text: The liquid nitrogen was applied to the skin utilizing a spray paint frosting technique.

## 2025-02-27 NOTE — ED NOTES
"Vital signs rechecked, patient continues to wait for x2 lab results and to go to ct. She is up to date on poc.  She stated \"i haven't taken my blood pressure medication yet. I take right before I go to bed\"  " Pt has appt on 4/29/25

## 2025-03-17 NOTE — Clinical Note
REFERRAL APPROVAL NOTICE         Sent on March 17, 2025                   Lyubov Bejarano  [address] [place] [address]                   Dear Ms. Bejarano,    After a careful review of the medical information and benefit coverage, [facility] has processed your referral. See below for additional details.    If applicable, you must be actively enrolled with your insurance for coverage of the authorized service. If you have any questions regarding your coverage, please contact your insurance directly.    REFERRAL INFORMATION   Referral #:  [id number]  Referred-To Department    Referred-By Provider:  Speech Therapy    Miquel Gramajo M.D.   [address]  [address]  [place] [address]  [phone] [address]  [place] [id number] [phone]    Referral Start Date:  03/17/2025  Referral End Date:   03/17/2026             SCHEDULING  If you do not already have an appointment, please call [phone] to make an appointment.     MORE INFORMATION  If you do not already have a [employer] account, sign up at: [email or web address]  You can access your medical information, make appointments, see lab results, billing information, and more.  If you have questions regarding this referral, please contact  the [facility] Referrals department at:             [phone]. Monday - Friday 8:00AM - 5:00PM.     Sincerely,    [facility]

## 2025-04-16 ENCOUNTER — SPEECH THERAPY (OUTPATIENT)
Dept: SPEECH THERAPY | Facility: OTHER | Age: 75
End: 2025-04-16
Payer: MEDICARE

## 2025-04-16 DIAGNOSIS — R13.12 OROPHARYNGEAL DYSPHAGIA: Chronic | ICD-10-CM

## 2025-04-16 PROCEDURE — 92610 EVALUATE SWALLOWING FUNCTION: CPT | Mod: GN | Performed by: SPEECH-LANGUAGE PATHOLOGIST

## 2025-04-16 NOTE — OP THERAPY EVALUATION
"  Outpatient Speech Therapy  INITIAL EVALUATION    [facility] Speech Pathology & Audiology  [address]  [place] [address]  Phone:  [phone]  Fax:  [phone]    Date of Evaluation: 04/16/2025    Patient: Lyubov Bejarano  YOB: 1950  MRN: [id number]     Referring Provider: Miquel Gramajo M.D.  [address] [place] [address] [address]  [address]  [place] [address]   Referring Diagnosis Dysphagia R13.12.     Time Calculation 2511-1602    Chief Complaint: Swallow Evaluation (Patient presents for swallowing evaluation at the request of Dr. Miquel Gramajo. Per review of referring MD clinic notes, patient is referred for dysphagia evaluation to \"rule out aspiration.\" )    Visit Diagnoses     ICD-10-CM   1. Oropharyngeal dysphagia  R13.12     Subjective Evaluation  The patient presents to SLP clinic for dysphagia evaluation.  The patient reports the following symptoms:     Dysphagia, Dysphonia, Cough:   Patient reports globus sensation localized to the level of the larynx X 3-4 years.     Reports it \"kind of affects my voice.\" Voice is \"a little rougher\" and sounds \"deeper.\" Identifies \"I clear my throat a lot.\"  Also reports the sensation of post-nasal drip.  Patient identifies an increased urge to throat clear in the morning.  Outside of oral intake, the patient reports she may take a deep breath, which might cause a coughing response triggered by her own saliva. The patient does not endorse traditional heartburn symptoms. Reports she is medicated for GERD.      The patient also endorses coughing while eating or drinking (\"almost aspirating with food and water\"). Patient reports this is influenced by her attention to the mealtime task or positioning during oral intake. The patient reports coughing during oral intake if she is not attending to the meal.  Symptoms are present with both liquid and solids food items. The patient does not endorse food avoidances, nor swallowing difficulty and certain food items. The " patient reports this is a new change for her. The patient also reports increased coughing with oral intake if she is in a reclined position.  Patient also identifies herself as a quick eater; she feels this contributes to an increased urge to throat clear when eating quickly.  As a result, the patient implements the following mealtime strategies: reduced rate of intake, drink more water.  Slowing down while eating helps.      To investigate this problem, the patient reports a prior ENT consult with the subsequent identification of reflux.  The patient reports she was also referred to GI for investigation of symptoms.    Patient reports barium swallow was completed at [facility] with subsequent report of hiatal hernia. Review of referring MD visit notes identify SLP, GI, and ENT referrals placed.     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      Additional PMH: The patient also reports a history of wheezing, now improved except for when lying down.  It is noteworthy to mention the patient reported a remote history of pneumonia requiring hospitalization and mechanical ventilation ~10 years ago. The patient endorsed some level of dysphagia at that time; now resolved. Reports that certain reclined positions for swallowing and flat position impact breathing.     Past Medical History:   Diagnosis Date    High cholesterol     Hypertension     Hypothyroid      No past surgical history on file.  Objective:   Other Treatment Interventions:  Clinical swallow evaluation  Functional Assessment Used    Reflux Symptom Index (RSI)   The RSI (Kj et al., 2002) is a self-administered nine-item outcomes instrument for laryngopharyngeal reflux. While some degree of reflux is present in normal individuals, RSI scores greater than 13 are considered abnormal. The patient revealed an RSI score of 12, indicating a normal RSI score.     Eating Assessment Tool - 10 (EAT-10)   The EAT-10  "(Kj et al., 2008) is a self-administered, symptom-specific outcome survey for measuring dysphagia severity and to monitor the treatment response in persons with a wide array of swallowing disorders. The normative data suggests that an EAT-10 score of 3 or more is abnormal. The patient revealed a score of 2, indicating normal results.      Voice Handicap Index-10 (VHI-10)  The VHI-10 (Cassidy et al., 2004) is a validated instrument to quantify patients' perception of their voice handicap. Normative data suggests that a score greater than 11 is considered abnormal (Alvaroa et al., 2012) The patient scored a 1, indicating a normal perception of voice handicap.    Functional Oral Intake Scale (FOIS)   The FOIS (Grace et al., 2005) is an ordinal scale that reflects the functional oral intake of patients with dysphagia. The patient scored a 7/7, which corresponds to a rating of “total oral intake with no restrictions.\"  The patient does not endorse any food avoidances.     The [place] Swallow Protocol (YSP)  The YSP (MILES Ramey, et al., 2014) is a water swallow screening tool used to identify aspiration risk. The water swallow screening task requires individuals to continuously drink 3 ounces of water without interruption or coughing (. The patient demonstrated the ability to complete the [place] Swallow Protocol 3-ounce water swallow challenge with a pass result, indicating a low risk for aspiration.     Speech Therapy Assessment:     Oral Motor Status:     Oral motor status comments: Cranial Nerve Exam   Cranial nerves V, VII, IX, X, XI, XII tested.  All tasks were completed normally.     Oral Phase Assessment:     Oral phase comments:      Pharyngeal Phase Assessment:     ST Pharyngeal Phase Assessment Used: MASA.    Farah Assessment of Swallowing Ability (MASA)    The MASA (Gagan, 2002) is a screening tool used for identifying eating and swallowing disorders in patients with various diseases. The MASA is scored using a " 5-point and 10-point rating scale, totaling 200 points. MASA score cut-off for severity ratings for dysphagia and aspiration include:      Clinical Assessment Items Patient Score Notes   Alertness 10/10    Cooperation 10/10    Auditory Comprehension  10/10    Respiration 10/10    Respiration rate for swallowing 5/5    Dysphasia 5/5    Dyspraxia 5/5    Dysarthria 5/5    Saliva 5/5    Lip seal 5/5    Tongue movement 10/10    Tongue strength 10/10    Tongue coordination 10/10    Oral preparation 10/10    Gag 1/5 Unable to target due to tongue bunching/guarding.   Palate 10/10    Bolus clearance 10/10    Oral transit 10/10    Cough reflex 1/5 None observed   Cough voluntary 10/10    Voice 8/10 Mild impairment, slight huskiness   Trache 10/10    Pharyngeal phase 10/10    Pharyngeal response 10/10    TOTAL SCORE                                                        190/200          Severity Group MASA Score - Dysphagia MASA Score - Aspiration   Minimal risk (WNL) < or equal to 178-200 < or equal to 170-200   Mild  < or equal to 168-177 < or equal to 149-169   Moderate  < or equal to 139-167 < or equal to 148    Severe < or equal to 138 < or equal to 140     Speech Therapy Plan :   Prognosis & Recommendations  Impression Summary:  Dysphagia unspecified.       Clinical swallow evaluation was completed this date.   Patient-reported outcome measures for voice handicap, reflux, and swallow related quality of life are normal.     Cranial nerve testing was unremarkable.  The [place] Swallow Protocol was completed with a pass result, indicating low risk for aspiration. The MASA rating tool revealed minimal risk for dysphagia and minimal risk for aspiration.      Although objective findings gather this date were within normal, the patient continues to report globus sensation, dysphagia to liquids and solids, and voice change.  Skilled SLP intervention is recommended at this time to further investigate the patient's reported  "symptoms.  A treatment plan including the following was presented and discussed with the patient: instrumental swallow evaluation via FEES to investigate oral-pharyngeal dysphagia, modified barium swallow study to investigate pharyngo-esophogeal dysphagia in light of prior reported hiatal hernia, recommendation for GI investigation of dysphagia symptoms,behavioral voice evaluation to investigate report of voice change.  The patient was in agreement with the proposed options, without additional questions at session conclusion, and a return to clinic has been scheduled.          Prognosis:  Good  Diet Recommendation:  Normal Consistency  Liquid Recommendation:  Thin  Goals  Short Term Goals:  1.  Instrumental swallow evaluation via FEES with goals to follow.   2. Modified barium swallow study with goals to follow.  3. Clinical voice evaluation with goals to follow.  Short Term Goal Duration (Weeks):  4-6 weeks  Long Term Goals:  1. Patient will improve swallow related quality of life as measured by a reported reduction in globus sensation.     Long Term Goal Duration (Weeks):  2-4 months  Patient Stated Goal:  \"I'd love to get rid of that lump in my throat. And that cough stuff really bugs me. \"   Therapy Recommendations  Recommendation:  Modified Barium Swallow Study and Dysphagia Treatment, MD may wish to consider the following referrals:  -GI consult for globus, reported history of hiatal hernia.  -Modified Barium Swallow Study with SLP for oral-pharyngeal dysphagia  Frequency:  1x week  Duration (in visits):  12  Duration (in weeks):  12    Referring provider co-signature:  I have reviewed this plan of care and my co-signature certifies the need for services.    Certification Period: 04/16/2025 to  07/27/25    Physician Signature: ________________________________ Date: ______________            "

## 2025-05-09 ENCOUNTER — TELEPHONE (OUTPATIENT)
Dept: SPEECH THERAPY | Facility: OTHER | Age: 75
End: 2025-05-09
Payer: MEDICARE

## 2025-06-12 ENCOUNTER — SPEECH THERAPY (OUTPATIENT)
Dept: SPEECH THERAPY | Facility: OTHER | Age: 75
End: 2025-06-12
Payer: MEDICARE

## 2025-06-12 DIAGNOSIS — R13.12 OROPHARYNGEAL DYSPHAGIA: Primary | ICD-10-CM

## 2025-06-12 DIAGNOSIS — R49.0 DYSPHONIA: ICD-10-CM

## 2025-06-12 PROCEDURE — 92524 BEHAVRAL QUALIT ANALYS VOICE: CPT | Mod: GN | Performed by: SPEECH-LANGUAGE PATHOLOGIST

## 2025-06-12 PROCEDURE — 92612 ENDOSCOPY SWALLOW (FEES) VID: CPT | Performed by: SPEECH-LANGUAGE PATHOLOGIST

## 2025-06-12 NOTE — OP THERAPY DAILY TREATMENT
Outpatient Speech Therapy  DAILY TREATMENT     [facility] Speech Pathology & Audiology  [address] [address]  Phone:  [phone]  Fax:  [phone]    Date: 06/12/2025    Patient: Lyubov Bejarano  YOB: 1950  MRN: [id number]     Time Calculation    Start time: 1500  Stop time: 1645 Time Calculation (min): 105 minutes       Chief Complaint: Hoarse and Dysphagia    Visit #: 2    Subjective Evaluation  The patient returns to clinic this date for continued investigation of swallowing and voice concerns.  The patient reports that she has not followed up with referring MD. She has not completed the MBSS.  Patient reports no changes in voice or swallow since last visit on 4/16/25.     Reports that “voice is still rough sounding especially after talking with clients all day and is still deep.”   Also reports a sensation “in my throat that causes me to clear my throat and cough a lot throughout the day”. The patient does not endorse traditional heartburn symptoms or family history of reflux. Reports she is medicated for GERD. Reports her voice problems began 4-5 years ago, specifically “feeling lump in throat”. Reports “not sure” if onset was sudden or gradual. Reports voice symptoms “get worse on workdays when talking with many clients” that resulted in increased throat clearing and coughing. Reports not talking above noise very often. No previous SLP treatment for her voice concern. No history of smoking. Reports drinking “not enough water, but I attempt to drink 8 cups a day”. Notes limiting her soda intake with no observable impact on above symptoms.      The patient also endorses coughing while eating or drinking. Patient reports that “If I am paying close attention the throat clears and coughs are limited while eating”. The patient reports that there are no swallow difficulties food avoidances, nor modifications that are being made at this time.     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      Additional PMH: Notably, the patient reported a history of pneumonia approximately 10 years ago, which resulted in hospitalization and ventilation. At that time, the patient experienced symptoms of dysphagia, which resolved thereafter.     Review of initial evaluation findings and plan of care were discussed with the patient, including FEES and voice evaluation.  They are in agreement with the proposed plan.     Objective:   Treatments/Interventions Performed:  Patient/Caregiver education  Other Treatment Interventions:  FEES exam, voice evaluation (non-instrumental)     Assessment:     [address] FEES Evaluation: 3:00-3:35, 4:23-4:45  Informed Consent: The purpose and method of procedure were explained to patient. A demonstration with scope in hand was explained. The patient was allowed to ask questions and seek clarification prior to the exam start. Patient provided verbal consent for participation in the procedure. Written consent obtained.     Endoscopic Comments    Procedure Type: FEES   Velopharyngeal Function:  complete  Pharyngeal Contraction:  intact  Vocal Fold Appearance: bilateral bowing    Ventricular Fold Appearance, base of tongue, epiglottis, and secretions: unremarkable.      VASES Residue Ratings %  0-100%   Bolus Trial Item Bolus Location at Swallow Onset Oropharynx   0-100% Hypopharynx  0-100% Epiglottis  0-100% Laryngeal Vestibule  0-100%  Vocal Folds  0-100% Subglottis  0-100% PAS Score     Thin liquid           5 mL thin oropharynx 0 3 1 0 0 0 1   15 mL thin  oropharynx 1 5 0 0 0 0 1   Mildly-thick liquid deferred   Puree item Bolus Location at Swallow Onset Oropharynx   0-100% Hypopharynx  0-100% Epiglottis  0-100% Laryngeal Vestibule  0-100%  Vocal Folds  0-100% Subglottis  0-100% PAS Score   1/2 tsp puree oropharynx 4 3 10 0 0 0 1   Heaping tsp puree oropharynx 4 6 30 0 0 0 1   Semi-solid item Bolus Location at  Swallow Onset Oropharynx   0-100% Hypopharynx  0-100% Epiglottis  0-100% Laryngeal Vestibule  0-100%  Vocal Folds  0-100% Subglottis  0-100% PAS Score   Fruits (1, 2 pieces) without juice oropharynx 1 piece: 0  2 pieces: 1 1 piece: 0  2 pieces: 0 1 piece: 0  2 pieces: 0 1 piece: 0  2 pieces: 0 1 piece: 0  2 pieces: 0 1 piece: 0  2 pieces: 0 1  1   Fruits (1, 2 pieces) with juice oropharynx 1 piece: 2  2 pieces: 2 1 piece: 2  2 pieces: 2 1 piece: 0  2 pieces: 0 1 piece: 0  2 pieces: 0 1 piece: 0  2 pieces: 0 1 piece: 0  2 pieces: 0 1   Solid item Bolus Location at Swallow Onset Oropharynx   0-100% Hypopharynx  0-100% Epiglottis  0-100% Laryngeal Vestibule  0-100%  Vocal Folds  0-100% Subglottis  0-100% PAS Score   1/4 shortbread cookie* oropharynx 0 0 0 0 0 0 1   * Demonstrated Piecemeal deglutition   Swallowing strategies Patient independently completes a secondary, cleansing swallow.    This reduced/eliminated any post-swallow pharyngeal stasis   Pharyngeal squeeze maneuver Intact       Dynamic Imaging Grade of Swallowing Toxicity for FEES (DIGEST-FEES)  DIGEST-FEES (Regla et al., 2021) was used to facilitate standardized interpretation of severity impairments in swallowing safety, swallowing efficiency, and total swallow function. DIGEST-FEES grades were derived using VASES ratings  Results revealed:   Safety Score: 0  Efficiency Score: 1  Level of impairment: Mild    Scales and Abbreviations  Penetration/Aspiration Scale “PAS”   1 - No penetration or aspiration      Airway protection: Good  Swallow efficiency:  Good-functional    [address]395 Behavioral and Qualitative Analysis of Voice and Resonance 3:36-4:09  See subjective report above for current symptoms warranting voice evaluation. Recall that voice-related patient reported outcome measures (PROMS) were completed at initial session dated 4/16/25.   PROMS revealed normal RSI and VHI.   Cranial Nerve testing was also completed 4/16/25 revealed normal cranial nerve  "exam.     CAPE-V   The Consensus Auditory-Perceptual Evaluation of Voice (CAPE-V; JOHN, 2009) was used for clinical auditory-perceptual assessment of voice. The CAPE-V (JOHN, 2009) evaluates salient perceptual vocal attributes across different domains (e.g. roughness, breathiness, strain, pitch, loudness, resonance, overall severity) as rated by the clinician.      Overall CAPE-V severity is rated as mildly deviant (consistent) characterized by individual attribute of roughness.  It is noteworthy to mention that roughness was more pronounced (e.g. moderately deviant) during sustained phonation vs connected speech.      [address] Laryngeal Function Studies (Acoustic and Aerodynamic Assessment): 4:10-4:23    Acoustic parameters:     Visi-Pitch Multi-dimensional: MDVP:     Sustained vowel  Parameters: Normative Data: Trial 1: Trial 2:    Fundamental Frequency 220 Hz 235.08 Hz 221.24 Hz   RAP (frequency) .68-1.040%  0.978% 1.12%   Shimmer (amplitude) 3.81%  2.6% 2.5%   NHR .19 0.13 0.139       Visi-Pitch Real Time:     Connected speech (\"Count #1-10\").  Parameters: Habitual  Trial 1, 2       Normative Data :  Average Speaking Fundamental Frequency Variability (Pitch Sigma)   Mean Hz 199.59 Hz  189.21    Minimum Hz 151.57 Hz  132.64    Maximum Hz 249.21 Hz  239.84    Range 107.64 Hz  107.20 Hz Mean adult females is 20.45 Hz (range of 19.75-21.15)       Singing pitch range  Parameters: Maximum Phonational Frequency Range (MPFR)  Trials 1, 2       Minimum Hz 59.93  70.67       Maximum Hz 762.15  1173.06       Range 708.62  1102.44       Semitones Mean C4 Range A1-G5  Mean A3, range C2-D6  ~50 semitones Normative data  40.89 semitones (Ma et al 2007)       Semitones: An adult, non-wislon female should present with a range of 22.2-36.7 semitones (Chris et. al., 2020). This indicates that the patient has a greater than average vocal range.    Aerodynamic measures    Sustained phonation at a comfortable level:   Parameters: " Results:  Trial 1, 2 Normative Data:   (Nikia et al., 2012)   Mean SPL 72.65 dB  70.34 dB 79.89 dB   range 62.65-87.06   Mean Pitch 208.5 Hz  201.81 Hz 185.27 Hz  146..69 Hz   Phonation Time 9.76 seconds  9.97 seconds 5.0 seconds  Range 4.78-7.58 seconds     Mean Expiratory Airflow 0.1 L/sec  0.21 L/sec 0.10 L/sec  Range 0.03-0.27   Expiratory Volume 1.0 L  0.94 L 0.6 L  Range 0.17-1.80     Maximum phonation:   Parameters: Results:  Trial 1, 2 Normative Data:   (Nikia et al., 2012)   Mean SPL 78.72 dB  77.50 dB 80.20 dB  Range 61.41-89.86   Mean Pitch 244.3 Hz  253.72 Hz 195.03 Hz  152-236.54   Phonation Time 10.2 sec  9.95 sec 21.57 sec  Range 9.58-29.20   Mean Expiratory Airflow 0.06 L/sec  0.09 L/sec .10 L/sec  Range 0.03-0.23   Expiratory Volume 0.60 L  0.89 L 1.91 L  0.71-2.78 L         Speech Therapy Plan :   Prognosis & Recommendations  Impression Summary:  Swallowing and voice evaluations were completed this date.     Swallowing:  Mild oropharyngeal dysphagia.   Instrumental swallow evaluation via FEES completed this date. DIGEST-FEES revealed mild swallow inefficiency.  This was limited to a single trial of a dry solid and was independently managed by secondary cleansing swallow.   Neither penetration nor aspiration occurred.     Voice:  Dysphonia of unknown origin.  Voice evaluation was completed this date. Vocal quality is characterized by consistent roughness (mild)  Patient reported outcome measures were remarkable for normal voice handicap and reflux symptoms.   Cranial nerve testing was unremarkable.  Acoustic assessment revealed abnormal RAP, increased pitch variability during speaking, greater than normal vocal flexibility as measured by the maximum phonation al frequency range.   Aerodynamic assessment revealed elevated mean pitch (maximum phonation) and expiratory volume (inconsistent)      Preliminary evaluation findings were discussed with the patient.  Provided verbal description of  "voice evaluation findings.  Provided multimodal review of FEES, including presentation of patient's FEES images with identification of anatomy and physiology, typical swallow function, noted post-swallow stasis managed by independent swallow.  The patient asked relevant questions during this teaching.     Skilled SLP is recommended at this time to address patient's dysphonia, globus sensation, and frequent throat clearing.  A plan of care including return to clinic for behavioral voice assessment and treatment were proposed. The patient declines further SLP intervention at this time, citing limited symptom impact (\"an annoyance\").  SLP encouraged patient to contact clinic, in the event that future services were desired.  The patient was in agreement and without additional questions at session conclusion.  No additional SLP, per patient request.    Note: Final results were discussed with the patient via telephone on 6/16/2025.  Patient affirms that she is without additional questions. No further visits desired by patient.     To address the patient's reported globus sensation, continue to recommend modified barium swallow study to investigate pharyngeal esophageal swallowing. This was recommended at initial evaluation and again discussed with the patient this date.  Referring MD may wish to consider referral for modified barium swallow study.  SLP encouraged patient to follow up with referring MD to determine the desired plan for further investigation of symptoms, if in alignment with patient's preferences.       Prognosis:  Good  Compensatory swallow strategies:  Multiple swallows  Diet Recommendation:  Normal Consistency  Liquid Recommendation:  Thin  Goals  Short Term Goals:   1. Stroboscopy with goals to follow.    2. Patient will demonstrate a reduction in throat clearing frequency (baseline TBD) by implementing cough replacement strategies.    3. Patient will independently return demonstrate laryngeal health " and wellness strategies (avoidance of phono-traumatic behaviors, GERD management, hydration, vocal warm-up/cool down) across 2 therapy sessions.     Short Term Goal Duration (Weeks):  1-2 months  Long Term Goals:  Patient will report a reduction in globus symptoms when compared to baseline.  Long Term Goal Duration (Weeks):  2-4 months  Therapy Recommendations  Recommendation:  Speech Therapy recommended but patient/family declining further services,

## 2025-07-28 ENCOUNTER — TELEPHONE (OUTPATIENT)
Dept: SPEECH THERAPY | Facility: OTHER | Age: 75
End: 2025-07-28
Payer: MEDICARE

## 2025-07-28 NOTE — OP THERAPY DISCHARGE SUMMARY
Outpatient Speech Therapy  DISCHARGE SUMMARY NOTE      [facility] Speech Pathology & Audiology  [address]  [place] [address]  Phone:  [phone]  Fax:  [phone]    Date of Visit: 07/28/2025    Patient: Lyubov Bejarano  YOB: 1950  MRN: [id number]     Referring Provider: Miquel Gramajo M.D.  [address] [place] [address] [address]  [place] [address]   Referring Diagnosis Dysphagia R13.12.       This discharge summary is completed outside of a clinic visit.  The patient was last seen in clinic on 6/12/2025.  Per review of the documentation associated with that date of service, the patient was recommended for but ultimately declined further SLP services.  The patient's therapy episode was kept open to allow for the patient to return to clinic during the established certification period, if services were desired. The patient did not return to clinic during the certification period.  Discharge SLP at this time.  Please consider re-consult if SLP services are desired in the future.  Thank you for this referral.